# Patient Record
Sex: FEMALE | Race: BLACK OR AFRICAN AMERICAN | NOT HISPANIC OR LATINO | RURAL
[De-identification: names, ages, dates, MRNs, and addresses within clinical notes are randomized per-mention and may not be internally consistent; named-entity substitution may affect disease eponyms.]

---

## 2017-04-04 ENCOUNTER — OFFICE VISIT (OUTPATIENT)
Dept: RETAIL CLINIC | Facility: CLINIC | Age: 37
End: 2017-04-04

## 2017-04-04 VITALS
WEIGHT: 276 LBS | DIASTOLIC BLOOD PRESSURE: 90 MMHG | RESPIRATION RATE: 18 BRPM | OXYGEN SATURATION: 96 % | HEIGHT: 64 IN | BODY MASS INDEX: 47.12 KG/M2 | HEART RATE: 78 BPM | SYSTOLIC BLOOD PRESSURE: 140 MMHG | TEMPERATURE: 97.6 F

## 2017-04-04 DIAGNOSIS — H65.193 OTHER ACUTE NONSUPPURATIVE OTITIS MEDIA OF BOTH EARS, RECURRENCE NOT SPECIFIED: Primary | ICD-10-CM

## 2017-04-04 DIAGNOSIS — J30.9 ALLERGIC RHINITIS, UNSPECIFIED ALLERGIC RHINITIS TRIGGER, UNSPECIFIED RHINITIS SEASONALITY: ICD-10-CM

## 2017-04-04 PROCEDURE — 96372 THER/PROPH/DIAG INJ SC/IM: CPT | Performed by: NURSE PRACTITIONER

## 2017-04-04 PROCEDURE — 99213 OFFICE O/P EST LOW 20 MIN: CPT | Performed by: NURSE PRACTITIONER

## 2017-04-04 RX ORDER — TRIAMCINOLONE ACETONIDE 40 MG/ML
40 INJECTION, SUSPENSION INTRA-ARTICULAR; INTRAMUSCULAR ONCE
Status: COMPLETED | OUTPATIENT
Start: 2017-04-04 | End: 2017-04-04

## 2017-04-04 RX ORDER — AMOXICILLIN 500 MG/1
500 CAPSULE ORAL 2 TIMES DAILY
Qty: 20 CAPSULE | Refills: 0 | Status: SHIPPED | OUTPATIENT
Start: 2017-04-04 | End: 2017-04-14

## 2017-04-04 RX ADMIN — TRIAMCINOLONE ACETONIDE 40 MG: 40 INJECTION, SUSPENSION INTRA-ARTICULAR; INTRAMUSCULAR at 11:00

## 2017-04-04 NOTE — PROGRESS NOTES
"Subjective   Carlota Ojeda is a 36 y.o. female. Patient comes into clinic today with concerns regarding:     \"Seasonal allergies, runny nose, colored mucus, runny eyes, sneezing.\"    History of Present Illness  Sx started 2 weeks ago. +eyes crusting and itching, congestion, cough with dark yellow mucus. Reports yearly history of allergic rhinitis exacerbation, Patient reports that she usually needs to get steroids when this steroids.     OTC Medications used:   Allegra-D  Sajan selzter cold and flu    The following portions of the patient's history were reviewed and updated as appropriate: allergies, current medications, past family history, past medical history, past social history, past surgical history and problem list.    Review of Systems   Constitutional: Negative for chills and fever.   HENT: Positive for congestion. Negative for ear pain, postnasal drip, sinus pressure and sore throat.    Eyes: Positive for discharge and itching.   Respiratory: Positive for cough. Negative for shortness of breath.    Cardiovascular: Negative for chest pain.   Gastrointestinal: Negative for abdominal pain, diarrhea, nausea and vomiting.   Allergic/Immunologic: Positive for environmental allergies.       No Known Allergies    Past Medical History:   Diagnosis Date   • Allergic    • Gallbladder abscess          Current Outpatient Prescriptions:   •  Fexofenadine-Pseudoephedrine (ALLEGRA-D PO), Take  by mouth., Disp: , Rfl:   •  Gfzegiyfh-CAS-UF-APAP (SAJAN-SELTZER PLUS COLD & FLU PO), Take  by mouth., Disp: , Rfl:   •  amoxicillin (AMOXIL) 500 MG capsule, Take 1 capsule by mouth 2 (Two) Times a Day for 10 days., Disp: 20 capsule, Rfl: 0    Current Facility-Administered Medications:   •  triamcinolone acetonide (KENALOG-40) injection 40 mg, 40 mg, Intramuscular, Once, ROSS Riley    History reviewed. No pertinent surgical history.    PCP: ROSS Robles    Objective   Physical Exam   Constitutional: She is oriented " "to person, place, and time. She appears well-developed and well-nourished.   HENT:   Right Ear: External ear and ear canal normal. Tympanic membrane is injected, erythematous and bulging. A middle ear effusion is present.   Left Ear: External ear and ear canal normal. Tympanic membrane is injected, erythematous and bulging. A middle ear effusion is present.   Nose: Mucosal edema present. Right sinus exhibits no maxillary sinus tenderness and no frontal sinus tenderness. Left sinus exhibits no maxillary sinus tenderness and no frontal sinus tenderness.   Mouth/Throat: Uvula is midline. Posterior oropharyngeal erythema present. Tonsils are 1+ on the right. Tonsils are 1+ on the left. No tonsillar exudate.   Eyes: Conjunctivae are normal. Pupils are equal, round, and reactive to light.   Neck: Neck supple. No thyromegaly present.   Cardiovascular: Regular rhythm.    Lymphadenopathy:     She has no cervical adenopathy.   Neurological: She is alert and oriented to person, place, and time.   CN II-XII grossly intact   Skin: Skin is warm and dry.   Psychiatric: She has a normal mood and affect. Her behavior is normal.   Vitals reviewed.      /90  Pulse 78  Temp 97.6 °F (36.4 °C) (Tympanic)   Resp 18  Ht 64\" (162.6 cm)  Wt 276 lb (125 kg)  LMP 04/01/2017 (Exact Date)  SpO2 96%  Breastfeeding? No  BMI 47.38 kg/m2    Assessment/Plan   Carlota was seen today for allergies.    Diagnoses and all orders for this visit:    Other acute nonsuppurative otitis media of both ears, recurrence not specified  -     amoxicillin (AMOXIL) 500 MG capsule; Take 1 capsule by mouth 2 (Two) Times a Day for 10 days.    Allergic rhinitis, unspecified allergic rhinitis trigger, unspecified rhinitis seasonality  -     triamcinolone acetonide (KENALOG-40) injection 40 mg; Inject 1 mL into the shoulder, thigh, or buttocks 1 (One) Time.     -Discussed dx with Patient  -Medication administration instructions given. 40mg Kenalog IM given to " Patient by PATT Garcia. Patient tolerated well.   -If sx worsen or do not improve seek higher level care  -Patient expressed verbal understanding of plan of care and rationale for interventions.    Declined work excuse note

## 2017-04-04 NOTE — PATIENT INSTRUCTIONS
-Discussed dx with Patient  -Medication administration instructions given. 40mg Kenalog IM given to Patient by PATT Garcia. Patient tolerated well.   -If sx worsen or do not improve seek higher level care  -Patient expressed verbal understanding of plan of care and rationale for interventions.    Declined work excuse note

## 2018-03-13 ENCOUNTER — OFFICE VISIT (OUTPATIENT)
Dept: FAMILY MEDICINE CLINIC | Facility: CLINIC | Age: 38
End: 2018-03-13

## 2018-03-13 VITALS
BODY MASS INDEX: 48.65 KG/M2 | OXYGEN SATURATION: 98 % | RESPIRATION RATE: 20 BRPM | TEMPERATURE: 98.1 F | DIASTOLIC BLOOD PRESSURE: 78 MMHG | WEIGHT: 285 LBS | HEIGHT: 64 IN | SYSTOLIC BLOOD PRESSURE: 124 MMHG | HEART RATE: 100 BPM

## 2018-03-13 DIAGNOSIS — G51.0 BELL'S PALSY: Primary | ICD-10-CM

## 2018-03-13 PROCEDURE — 99214 OFFICE O/P EST MOD 30 MIN: CPT | Performed by: NURSE PRACTITIONER

## 2018-03-13 RX ORDER — AMOXICILLIN 875 MG/1
875 TABLET, COATED ORAL 2 TIMES DAILY
Refills: 0 | COMMUNITY
Start: 2018-03-09 | End: 2018-11-05

## 2018-03-13 RX ORDER — PREDNISONE 20 MG/1
60 TABLET ORAL DAILY
Refills: 0 | COMMUNITY
Start: 2018-03-11 | End: 2018-11-05

## 2018-03-13 RX ORDER — VALACYCLOVIR HYDROCHLORIDE 1 G/1
1 TABLET, FILM COATED ORAL DAILY
Refills: 0 | COMMUNITY
Start: 2018-03-11 | End: 2018-11-05

## 2018-03-13 NOTE — PROGRESS NOTES
Subjective   Carlota Ojeda is a 37 y.o. female.     Here today with bells palsy of the right side of her face.  She reports she woke up with it 4 days ago.  She presented to the er the next day.  She was dx with bells palsy and was placed on zovirax and prednisone.  Is getting somewhat better.      Paralysis   This is a new (right side of face.) problem. The current episode started in the past 7 days. The problem occurs constantly. The problem has been gradually improving. Pertinent negatives include no abdominal pain, anorexia, arthralgias, change in bowel habit, chest pain, chills, congestion, coughing, diaphoresis, fatigue, fever, headaches, joint swelling, myalgias, nausea, neck pain, numbness, rash, sore throat, swollen glands, urinary symptoms, vertigo, visual change, vomiting or weakness. Nothing aggravates the symptoms. Treatments tried: see above. The treatment provided mild relief.        The following portions of the patient's history were reviewed and updated as appropriate: allergies, current medications, past family history, past medical history, past social history, past surgical history and problem list.    Review of Systems   Constitutional: Negative.  Negative for chills, diaphoresis, fatigue and fever.   HENT: Negative for congestion and sore throat.         [place] palsy right side of face.   Respiratory: Negative.  Negative for cough.    Cardiovascular: Negative.  Negative for chest pain.   Gastrointestinal: Negative for abdominal pain, anorexia, change in bowel habit, nausea and vomiting.   Musculoskeletal: Negative.  Negative for arthralgias, joint swelling, myalgias and neck pain.   Skin: Negative for rash.   Neurological: Negative.  Negative for vertigo, weakness, numbness and headaches.   Psychiatric/Behavioral: Negative.        Objective   Physical Exam   Constitutional: She is oriented to person, place, and time. She appears well-developed and well-nourished. No distress.   HENT:   Head:  Normocephalic.   Right Ear: External ear normal.   Left Ear: External ear normal.   Nose: Nose normal.   Mouth/Throat: Oropharynx is clear and moist. No oropharyngeal exudate.   Drooping of right side of face noted.    Eyes:   Does have poor ability to close right eye.   Neck: Normal range of motion.   Cardiovascular: Normal rate, regular rhythm and normal heart sounds.  Exam reveals no friction rub.    No murmur heard.  Pulmonary/Chest: Effort normal and breath sounds normal. No respiratory distress. She has no wheezes. She has no rales.   Abdominal: Soft.   Musculoskeletal: Normal range of motion.   Neurological: She is alert and oriented to person, place, and time.   See heent   Skin: Skin is warm and dry.   Psychiatric: She has a normal mood and affect. Thought content normal.   Nursing note and vitals reviewed.        Assessment/Plan   Carlota was seen today for paralysis.    Diagnoses and all orders for this visit:    Bell's palsy    cont with meds prescribed by er.  Will cont to watch for now.

## 2018-03-19 ENCOUNTER — OFFICE VISIT (OUTPATIENT)
Dept: FAMILY MEDICINE CLINIC | Facility: CLINIC | Age: 38
End: 2018-03-19

## 2018-03-19 VITALS
HEART RATE: 91 BPM | WEIGHT: 286 LBS | SYSTOLIC BLOOD PRESSURE: 127 MMHG | TEMPERATURE: 98.7 F | BODY MASS INDEX: 48.83 KG/M2 | DIASTOLIC BLOOD PRESSURE: 85 MMHG | OXYGEN SATURATION: 97 % | HEIGHT: 64 IN | RESPIRATION RATE: 20 BRPM

## 2018-03-19 DIAGNOSIS — T78.40XA ALLERGIC REACTION, INITIAL ENCOUNTER: Primary | ICD-10-CM

## 2018-03-19 PROCEDURE — 99214 OFFICE O/P EST MOD 30 MIN: CPT | Performed by: NURSE PRACTITIONER

## 2018-03-20 NOTE — PROGRESS NOTES
Subjective   Carlota Ojeda is a 37 y.o. female.     Here today stating that about 3 days ago she was headed to an exercise class and took two aspirin before she left.  She had an allergic reaction shortly afterward with swelling of her tongue and eyes,  Felt her throat closing.  She presented to the er at [facility] the [facility] and they gave her iv benadryl which stopped the reaction.      Allergic Reaction   This is a new problem. The current episode started 3 to 5 days ago. Episode frequency: has resolved. The problem has been resolved since onset. The problem is moderate. The patient was exposed to an OTC medication (aspirin). The exposure occurred at home. Associated symptoms include difficulty breathing, eye watering and trouble swallowing. Pertinent negatives include no abdominal pain, chest pain, chest pressure, coughing, diarrhea, drooling, eye itching, eye redness, globus sensation, hyperventilation, itching, rash, stridor, vomiting or wheezing. Swelling is present on the throat, lips and face. Past treatments include diphenhydramine. The treatment provided significant relief.        The following portions of the patient's history were reviewed and updated as appropriate: allergies, current medications, past family history, past medical history, past social history, past surgical history and problem list.    Review of Systems   Constitutional: Negative.    HENT: Positive for trouble swallowing. Negative for drooling.    Eyes: Negative for redness and itching.   Respiratory: Negative.  Negative for cough, wheezing and stridor.    Cardiovascular: Negative.  Negative for chest pain.   Gastrointestinal: Negative for abdominal pain, diarrhea and vomiting.   Musculoskeletal: Negative.    Skin: Negative.  Negative for itching and rash.   Neurological: Negative.    Psychiatric/Behavioral: Negative.        Objective   Physical Exam   Constitutional: She is oriented to person, place, and time. She appears well-developed and  well-nourished. No distress.   HENT:   Head: Normocephalic.   Right Ear: External ear normal.   Left Ear: External ear normal.   Nose: Nose normal.   Mouth/Throat: Oropharynx is clear and moist. No oropharyngeal exudate.   No swelling of tongue, lips or face noted.   Eyes: Pupils are equal, round, and reactive to light.   Neck: Normal range of motion. Neck supple.   Cardiovascular: Normal rate, regular rhythm and normal heart sounds.  Exam reveals no friction rub.    No murmur heard.  Pulmonary/Chest: Effort normal and breath sounds normal. No respiratory distress. She has no wheezes. She has no rales.   Abdominal: Soft.   Musculoskeletal: Normal range of motion.   Neurological: She is alert and oriented to person, place, and time.   Skin: Skin is warm and dry.   Psychiatric: She has a normal mood and affect. Thought content normal.   Nursing note and vitals reviewed.        Assessment/Plan   Carlota was seen today for bell's palsy.    Diagnoses and all orders for this visit:    Allergic reaction, initial encounter  -     Ambulatory Referral to Allergy      She wants a referral to an allergy specialist.  Will make the referral and have asked her to avoid aspirin and to be cautious with taking other nsaids.

## 2018-11-05 ENCOUNTER — OFFICE VISIT (OUTPATIENT)
Dept: FAMILY MEDICINE CLINIC | Facility: CLINIC | Age: 38
End: 2018-11-05

## 2018-11-05 VITALS
BODY MASS INDEX: 45 KG/M2 | TEMPERATURE: 98.5 F | SYSTOLIC BLOOD PRESSURE: 136 MMHG | HEIGHT: 66 IN | DIASTOLIC BLOOD PRESSURE: 100 MMHG | WEIGHT: 280 LBS | HEART RATE: 53 BPM | OXYGEN SATURATION: 98 %

## 2018-11-05 DIAGNOSIS — J01.90 ACUTE SINUSITIS, RECURRENCE NOT SPECIFIED, UNSPECIFIED LOCATION: Primary | ICD-10-CM

## 2018-11-05 DIAGNOSIS — R03.0 ELEVATED BLOOD PRESSURE READING: ICD-10-CM

## 2018-11-05 PROCEDURE — 96372 THER/PROPH/DIAG INJ SC/IM: CPT | Performed by: NURSE PRACTITIONER

## 2018-11-05 PROCEDURE — 99214 OFFICE O/P EST MOD 30 MIN: CPT | Performed by: NURSE PRACTITIONER

## 2018-11-05 RX ORDER — AMOXICILLIN AND CLAVULANATE POTASSIUM 875; 125 MG/1; MG/1
1 TABLET, FILM COATED ORAL EVERY 12 HOURS SCHEDULED
Qty: 20 TABLET | Refills: 0 | Status: SHIPPED | OUTPATIENT
Start: 2018-11-05 | End: 2018-11-15

## 2018-11-05 RX ORDER — DIPHENHYDRAMINE HCL 25 MG
25 CAPSULE ORAL EVERY 6 HOURS PRN
COMMUNITY

## 2018-11-05 RX ORDER — FLUCONAZOLE 150 MG/1
TABLET ORAL
Qty: 2 TABLET | Refills: 0 | Status: SHIPPED | OUTPATIENT
Start: 2018-11-05

## 2018-11-05 RX ORDER — TRIAMCINOLONE ACETONIDE 40 MG/ML
80 INJECTION, SUSPENSION INTRA-ARTICULAR; INTRAMUSCULAR ONCE
Status: COMPLETED | OUTPATIENT
Start: 2018-11-05 | End: 2018-11-05

## 2018-11-05 RX ADMIN — TRIAMCINOLONE ACETONIDE 80 MG: 40 INJECTION, SUSPENSION INTRA-ARTICULAR; INTRAMUSCULAR at 16:03

## 2018-11-05 NOTE — PROGRESS NOTES
"Subjective   Carlota Ojeda is a 38 y.o. female.     [facility] Visit    PCP: ROSS Jones    CC: \"onset of sinus infection; drainage, headache, sore throat, t-zone facial pain\"    See allergist and received weekly allergy injections.  Takes Allegra daily and has added Bendaryl since symptoms started.    B/P elevated in office today, but believes it is because she feels bad.  Checks at home and diastolic usually never higher than the 80's.       Sinusitis   This is a new problem. The current episode started in the past 7 days. The problem has been gradually worsening since onset. There has been no fever. Her pain is at a severity of 6/10. Associated symptoms include congestion, coughing (mild), headaches, sinus pressure, sneezing and a sore throat. Pertinent negatives include no chills, diaphoresis, ear pain, hoarse voice, neck pain, shortness of breath or swollen glands. Treatments tried: allegra, benadryl. The treatment provided mild relief.   Facial Pain   This is a new problem. The current episode started in the past 7 days. The problem occurs daily. The problem has been gradually worsening. Associated symptoms include congestion, coughing (mild), headaches and a sore throat. Pertinent negatives include no abdominal pain, anorexia, arthralgias, change in bowel habit, chest pain, chills, diaphoresis, fatigue, fever, myalgias, nausea, neck pain, numbness, rash, swollen glands, urinary symptoms, vertigo, visual change, vomiting or weakness. Treatments tried: allegra, benadryl.   Headache    This is a new problem. The current episode started in the past 7 days. The problem occurs daily. The problem has been gradually worsening. The pain is located in the frontal region. Quality: pressure. The pain is at a severity of 6/10. Associated symptoms include coughing (mild), drainage, rhinorrhea ( yellow), sinus pressure and a sore throat. Pertinent negatives include no abdominal pain, abnormal behavior, " anorexia, back pain, blurred vision, dizziness, ear pain, eye pain, eye redness, eye watering, facial sweating, fever, hearing loss, insomnia, loss of balance, muscle aches, nausea, neck pain, numbness, phonophobia, photophobia, scalp tenderness, seizures, swollen glands, tingling, tinnitus, visual change, vomiting, weakness or weight loss.   Sore Throat    This is a new problem. The current episode started in the past 7 days. The problem has been waxing and waning. There has been no fever. Associated symptoms include congestion, coughing (mild) and headaches. Pertinent negatives include no abdominal pain, ear discharge, ear pain, hoarse voice, neck pain, shortness of breath, swollen glands or vomiting. She has had no exposure to strep or mono.        The following portions of the patient's history were reviewed and updated as appropriate: allergies, current medications, past medical history, past social history, past surgical history and problem list.    Review of Systems   Constitutional: Negative for chills, diaphoresis, fatigue, fever and unexpected weight loss.   HENT: Positive for congestion, postnasal drip, rhinorrhea ( yellow), sinus pressure, sneezing and sore throat. Negative for ear discharge, ear pain, facial swelling, hearing loss, hoarse voice, nosebleeds and tinnitus.    Eyes: Negative for blurred vision, photophobia, pain and redness.   Respiratory: Positive for cough (mild). Negative for chest tightness and shortness of breath.    Cardiovascular: Negative for chest pain and leg swelling.   Gastrointestinal: Negative for abdominal pain, anorexia, change in bowel habit, nausea and vomiting.   Musculoskeletal: Negative for arthralgias, back pain, myalgias, neck pain and neck stiffness.   Skin: Negative for rash.   Neurological: Positive for headache. Negative for dizziness, vertigo, tingling, seizures, weakness, numbness and loss of balance.   Hematological: Negative for adenopathy.  "  Psychiatric/Behavioral: The patient does not have insomnia.        Objective    /100 (BP Location: Left arm, Patient Position: Sitting, Cuff Size: Adult)   Pulse 53   Temp 98.5 °F (36.9 °C) (Tympanic)   Ht 166.4 cm (65.5\")   Wt 127 kg (280 lb)   LMP 11/05/2018   SpO2 98%   BMI 45.89 kg/m²     Physical Exam   Constitutional: She is oriented to person, place, and time. She appears well-developed and well-nourished. No distress.   HENT:   Head: Normocephalic and atraumatic.   Right Ear: Tympanic membrane and ear canal normal.   Left Ear: Tympanic membrane and ear canal normal.   Nose: Mucosal edema and congestion present. Right sinus exhibits frontal sinus tenderness ( ethmoid). Right sinus exhibits no maxillary sinus tenderness. Left sinus exhibits frontal sinus tenderness ( ethmoid). Left sinus exhibits no maxillary sinus tenderness.   Mouth/Throat: Uvula is midline and mucous membranes are normal. Posterior oropharyngeal erythema (injected with PND) present. No oropharyngeal exudate.   Eyes: Conjunctivae are normal.   Neck: Neck supple.   Cardiovascular: Normal rate and regular rhythm.    Apical rate: 64   Pulmonary/Chest: Effort normal and breath sounds normal. She has no wheezes. She has no rales.   Lymphadenopathy:     She has no cervical adenopathy.   Neurological: She is alert and oriented to person, place, and time.   Nursing note and vitals reviewed.        Assessment/Plan   Carlota was seen today for sinusitis, facial pain, headache and sore throat.    Diagnoses and all orders for this visit:    Acute sinusitis, recurrence not specified, unspecified location  -     triamcinolone acetonide (KENALOG-40) injection 80 mg; Inject 2 mL into the appropriate muscle as directed by prescriber 1 (One) Time.  -     amoxicillin-clavulanate (AUGMENTIN) 875-125 MG per tablet; Take 1 tablet by mouth Every 12 (Twelve) Hours for 10 days.    Elevated blood pressure reading    Other orders  -     fluconazole " (DIFLUCAN) 150 MG tablet; 1 tab po x 1 now, may repeat in 4 days prn yeast    Kenalog 80 mg IM x 1 in office  Rx for Augmentin  Continue with Allegra, Benadryl  Saline rinses as needed  Continue with weekly allergy injections as scheduled    Will monitor b/p at home and if it remains elevated, will schedule f/u with PCP    See PCP or RTC if symptoms persist/worsen.

## 2021-04-15 ENCOUNTER — OFFICE VISIT (OUTPATIENT)
Dept: FAMILY MEDICINE CLINIC | Facility: CLINIC | Age: 41
End: 2021-04-15

## 2021-04-15 VITALS
BODY MASS INDEX: 45.64 KG/M2 | OXYGEN SATURATION: 100 % | SYSTOLIC BLOOD PRESSURE: 118 MMHG | WEIGHT: 284 LBS | HEIGHT: 66 IN | HEART RATE: 77 BPM | DIASTOLIC BLOOD PRESSURE: 84 MMHG | RESPIRATION RATE: 18 BRPM

## 2021-04-15 DIAGNOSIS — Z11.59 NEED FOR HEPATITIS C SCREENING TEST: ICD-10-CM

## 2021-04-15 DIAGNOSIS — J30.89 SEASONAL ALLERGIC RHINITIS DUE TO OTHER ALLERGIC TRIGGER: Primary | ICD-10-CM

## 2021-04-15 DIAGNOSIS — R63.5 WEIGHT GAIN: ICD-10-CM

## 2021-04-15 DIAGNOSIS — R53.83 FATIGUE, UNSPECIFIED TYPE: ICD-10-CM

## 2021-04-15 PROCEDURE — 99204 OFFICE O/P NEW MOD 45 MIN: CPT | Performed by: STUDENT IN AN ORGANIZED HEALTH CARE EDUCATION/TRAINING PROGRAM

## 2021-04-15 RX ORDER — FEXOFENADINE HCL 180 MG/1
180 TABLET ORAL DAILY
COMMUNITY
Start: 2021-01-31

## 2021-04-15 RX ORDER — MONTELUKAST SODIUM 10 MG/1
10 TABLET ORAL NIGHTLY
Qty: 90 TABLET | Refills: 1 | Status: SHIPPED | OUTPATIENT
Start: 2021-04-15

## 2021-04-15 NOTE — PROGRESS NOTES
"   Subjective:  Carlota Ojeda is a 40 y.o. female who presents for establish care    Allergies;  See's allergy medicine, gets weekly injections, states that usually well controlled with injections. Takes allegra and fluticasone.  Tolerating well, no adverse effects. Gets sinus infections 1-2 per week. Has had success with Singulair in the past.      Additionally, patient with increased fatigue as of late, weight gain.  Admits to being less active, concern for thyroid issues.  No cold intolerance, constipation, hair loss.      Patient Active Problem List   Diagnosis   • Acute sinusitis   • Elevated blood pressure reading     Vitals:    Vitals:    04/15/21 0823   BP: 118/84   Pulse: 77   Resp: 18   SpO2: 100%   Weight: 129 kg (284 lb)   Height: 166.4 cm (65.5\")     Body mass index is 46.54 kg/m².    Current Outpatient Medications:   •  diphenhydrAMINE (BENADRYL) 25 mg capsule, Take 25 mg by mouth Every 6 (Six) Hours As Needed for Itching., Disp: , Rfl:   •  fluconazole (DIFLUCAN) 150 MG tablet, 1 tab po x 1 now, may repeat in 4 days prn yeast, Disp: 2 tablet, Rfl: 0  •  fexofenadine (ALLEGRA) 180 MG tablet, Take 180 mg by mouth Daily., Disp: , Rfl:   •  montelukast (Singulair) 10 MG tablet, Take 1 tablet by mouth Every Night., Disp: 90 tablet, Rfl: 1    Review of Systems  Review of Systems   Constitutional: Negative for appetite change and fever.   HENT: Negative for sore throat.    Eyes: Negative for discharge and visual disturbance.   Respiratory: Negative for cough and shortness of breath.    Cardiovascular: Negative for chest pain, palpitations and leg swelling.   Gastrointestinal: Negative for abdominal pain, diarrhea and vomiting.   Genitourinary: Negative for dysuria.   Skin: Negative for rash.   Neurological: Negative for light-headedness and headaches.   Psychiatric/Behavioral: Negative for agitation.       Patient Active Problem List   Diagnosis   • Acute sinusitis   • Elevated blood pressure reading "     History reviewed. No pertinent surgical history.  Social History     Socioeconomic History   • Marital status: Single     Spouse name: Not on file   • Number of children: Not on file   • Years of education: Not on file   • Highest education level: Not on file   Tobacco Use   • Smoking status: Never Smoker   • Smokeless tobacco: Never Used   Substance and Sexual Activity   • Alcohol use: No   • Drug use: No   • Sexual activity: Defer     Family History   Problem Relation Age of Onset   • Heart disease Father    • Diabetes Sister    • Diabetes Brother      No visits with results within 6 Month(s) from this visit.   Latest known visit with results is:   No results found for any previous visit.      No image results found.    @[facility]@    There is no immunization history on file for this patient.    The following portions of the patient's history were reviewed and updated as appropriate: allergies, current medications, past family history, past medical history, past social history, past surgical history and problem list.    Physical Exam  Physical Exam  Constitutional:       Appearance: Normal appearance.   HENT:      Head: Normocephalic and atraumatic.      Right Ear: Tympanic membrane and ear canal normal.      Left Ear: Tympanic membrane and ear canal normal.   Eyes:      General:         Right eye: No discharge.         Left eye: No discharge.      Conjunctiva/sclera: Conjunctivae normal.   Cardiovascular:      Rate and Rhythm: Normal rate and regular rhythm.      Pulses: Normal pulses.      Heart sounds: Normal heart sounds. No murmur heard.     Pulmonary:      Effort: Pulmonary effort is normal. No respiratory distress.      Breath sounds: Normal breath sounds.   Abdominal:      General: There is no distension.      Palpations: Abdomen is soft.      Tenderness: There is no abdominal tenderness.   Musculoskeletal:      Cervical back: Normal range of motion.   Lymphadenopathy:      Cervical: No cervical  adenopathy.   Neurological:      Mental Status: She is alert. Mental status is at baseline.   Psychiatric:         Mood and Affect: Mood normal.         Behavior: Behavior normal.       Assessment/Plan    Diagnosis Plan   1. Seasonal allergic rhinitis due to other allergic trigger  montelukast (Singulair) 10 MG tablet   2. Need for hepatitis C screening test  HCV Antibody Rfx To Qnt PCR   3. Weight gain  Comprehensive Metabolic Panel    CBC & Differential    TSH Rfx On Abnormal To Free T4   4. Fatigue, unspecified type  Comprehensive Metabolic Panel    CBC & Differential    TSH Rfx On Abnormal To Free T4      Orders Placed This Encounter   Procedures   • HCV Antibody Rfx To Qnt PCR     Order Specific Question:   Release to patient     Answer:   Immediate   • Comprehensive Metabolic Panel     Order Specific Question:   Release to patient     Answer:   Immediate   • TSH Rfx On Abnormal To Free T4     Order Specific Question:   Release to patient     Answer:   Immediate   • CBC & Differential     Order Specific Question:   Manual Differential     Answer:   No       Seasonal allergies; patient already on Allegra, will add Singulair and discussed importance of nasal sprays.  Patient agreeable to plan.  No complications.    Fatigue + weight gain; unclear etiology, likely due to more sedentary lifestyle with pandemic, will obtain labs as above, follow-up in 1 month, sooner if needed.  Discussed importance of diet, exercise and weight loss.  Patient agreeable.      This document has been electronically signed by Jeramie Mccarthy MD on April 15, 2021 08:54 CDT